# Patient Record
Sex: MALE | Race: WHITE | NOT HISPANIC OR LATINO | Employment: FULL TIME | URBAN - METROPOLITAN AREA
[De-identification: names, ages, dates, MRNs, and addresses within clinical notes are randomized per-mention and may not be internally consistent; named-entity substitution may affect disease eponyms.]

---

## 2019-07-02 ENCOUNTER — OFFICE VISIT (OUTPATIENT)
Dept: FAMILY MEDICINE | Facility: OTHER | Age: 39
End: 2019-07-02
Payer: COMMERCIAL

## 2019-07-02 VITALS
RESPIRATION RATE: 16 BRPM | HEART RATE: 84 BPM | WEIGHT: 268 LBS | TEMPERATURE: 98.7 F | DIASTOLIC BLOOD PRESSURE: 76 MMHG | SYSTOLIC BLOOD PRESSURE: 122 MMHG | BODY MASS INDEX: 32.63 KG/M2 | OXYGEN SATURATION: 98 % | HEIGHT: 76 IN

## 2019-07-02 DIAGNOSIS — Z23 NEED FOR TETANUS BOOSTER: ICD-10-CM

## 2019-07-02 DIAGNOSIS — F43.21 GRIEF REACTION: ICD-10-CM

## 2019-07-02 DIAGNOSIS — L03.012 CELLULITIS OF FINGER OF LEFT HAND: Primary | ICD-10-CM

## 2019-07-02 PROCEDURE — 90715 TDAP VACCINE 7 YRS/> IM: CPT | Performed by: PHYSICIAN ASSISTANT

## 2019-07-02 PROCEDURE — 90471 IMMUNIZATION ADMIN: CPT | Performed by: PHYSICIAN ASSISTANT

## 2019-07-02 PROCEDURE — 99203 OFFICE O/P NEW LOW 30 MIN: CPT | Mod: 25 | Performed by: PHYSICIAN ASSISTANT

## 2019-07-02 RX ORDER — CEPHALEXIN 500 MG/1
500 CAPSULE ORAL 2 TIMES DAILY
Qty: 14 CAPSULE | Refills: 0 | Status: SHIPPED | OUTPATIENT
Start: 2019-07-02 | End: 2019-12-04

## 2019-07-02 ASSESSMENT — PATIENT HEALTH QUESTIONNAIRE - PHQ9
SUM OF ALL RESPONSES TO PHQ QUESTIONS 1-9: 11
SUM OF ALL RESPONSES TO PHQ QUESTIONS 1-9: 11

## 2019-07-02 ASSESSMENT — MIFFLIN-ST. JEOR: SCORE: 2243.17

## 2019-07-02 ASSESSMENT — PAIN SCALES - GENERAL: PAINLEVEL: NO PAIN (0)

## 2019-07-02 NOTE — PROGRESS NOTES
"Subjective     Dar Palma is a 38 year old male who presents to clinic today for the following health issues:    HPI   Bee sting 6/30/19 left pinky, red and swollen and warm    Patient reports he had a bee sting on 6/30/2019. This occurred on the distal tip of his left pinky finger. He reports he cleaned the area and ensured the stinger was removed right after this happened. He reports since then his pinky finger has been red, swollen and warm. He reports pain really just started today. No open sores or drainage. He has been applying ice and taking Benadryl without improvement. No issue with bee stings in the past.     Addressed moods today. Patient reports his wife passed 3.5 months ago. Doing as best as he can. Feels moods are appropriate for recent loss. Declines any assistance/therapy at this time.     There is no problem list on file for this patient.    History reviewed. No pertinent surgical history.    Social History     Tobacco Use     Smoking status: Never Smoker     Smokeless tobacco: Never Used   Substance Use Topics     Alcohol use: Yes     No family history on file.      Current Outpatient Medications   Medication Sig Dispense Refill     cephALEXin (KEFLEX) 500 MG capsule Take 1 capsule (500 mg) by mouth 2 times daily for 7 days 14 capsule 0     No Known Allergies  BP Readings from Last 3 Encounters:   07/02/19 122/76    Wt Readings from Last 3 Encounters:   07/02/19 121.6 kg (268 lb)   06/06/16 112.5 kg (248 lb)         Reviewed and updated as needed this visit by Provider  Allergies  Meds  Problems  Med Hx  Surg Hx         Review of Systems   ROS COMP: Constitutional, HEENT, cardiovascular, pulmonary, gi and gu systems are negative, except as otherwise noted.      Objective    /76   Pulse 84   Temp 98.7  F (37.1  C) (Temporal)   Resp 16   Ht 1.924 m (6' 3.75\")   Wt 121.6 kg (268 lb)   SpO2 98%   BMI 32.84 kg/m    Body mass index is 32.84 kg/m .  Physical Exam   GENERAL: " healthy, alert and no distress  SKIN: no obvious defect from bee sting. Entire finger erythematous and edematous. Area is warm and mildly tender to the touch. Full ROM of finger.   PSYCH: mentation appears normal, affect normal/bright, anxious, judgement and insight intact and appearance well groomed    Diagnostic Test Results:  Labs reviewed in Epic  none         Assessment & Plan     1. Cellulitis of finger of left hand  Symptoms consistent with cellulitis from insect sting. Will start antibiotics as below. Encouraged keeping finger clean and dry. Discussed monitoring for signs of worsening infection. Patient will follow-up in clinic if new symptoms develop or current symptoms fail to improve.  - cephALEXin (KEFLEX) 500 MG capsule; Take 1 capsule (500 mg) by mouth 2 times daily for 7 days  Dispense: 14 capsule; Refill: 0    2. Need for tetanus booster  - TDAP, IM (10 - 64 YRS) - Adacel    3. Grief reaction  Patient feels he is getting along OK. Declines any help with moods at this time.     The patient indicates understanding of these issues and agrees with the plan.    Nighat Charles PA-C  [facility]    Answers for HPI/ROS submitted by the patient on 7/2/2019   PHQ9 TOTAL SCORE: 11

## 2019-07-02 NOTE — PROGRESS NOTES
Screening Questionnaire for Adult Immunization    Are you sick today?   No   Do you have allergies to medications, food, a vaccine component or latex?   No   Have you ever had a serious reaction after receiving a vaccination?   No   Do you have a long-term health problem with heart disease, lung disease, asthma, kidney disease, metabolic disease (e.g. diabetes), anemia, or other blood disorder?   No   Do you have cancer, leukemia, HIV/AIDS, or any other immune system problem?   No   In the past 3 months, have you taken medications that affect  your immune system, such as prednisone, other steroids, or anticancer drugs; drugs for the treatment of rheumatoid arthritis, Crohn s disease, or psoriasis; or have you had radiation treatments?   No   Have you had a seizure, or a brain or other nervous system problem?   No   During the past year, have you received a transfusion of blood or blood     products, or been given immune (gamma) globulin or antiviral drug?   No   For women: Are you pregnant or is there a chance you could become        pregnant during the next month?   No   Have you received any vaccinations in the past 4 weeks?   No     Immunization questionnaire answers were all negative.        Per orders of Nighat Charles, injection of Tdap given by Flavio Degroot. Patient instructed to remain in clinic for 15 minutes afterwards, and to report any adverse reaction to me immediately.       Screening performed by Flavio Degroot on 7/2/2019 at 9:16 AM.

## 2019-12-04 ENCOUNTER — OFFICE VISIT (OUTPATIENT)
Dept: FAMILY MEDICINE | Facility: CLINIC | Age: 39
End: 2019-12-04
Payer: COMMERCIAL

## 2019-12-04 VITALS
SYSTOLIC BLOOD PRESSURE: 132 MMHG | DIASTOLIC BLOOD PRESSURE: 86 MMHG | BODY MASS INDEX: 31.41 KG/M2 | HEIGHT: 75 IN | HEART RATE: 100 BPM | RESPIRATION RATE: 22 BRPM | WEIGHT: 252.6 LBS | TEMPERATURE: 98 F | OXYGEN SATURATION: 100 %

## 2019-12-04 DIAGNOSIS — F41.8 DEPRESSION WITH ANXIETY: Primary | ICD-10-CM

## 2019-12-04 DIAGNOSIS — F43.21 GRIEVING: ICD-10-CM

## 2019-12-04 DIAGNOSIS — Z13.6 CARDIOVASCULAR SCREENING; LDL GOAL LESS THAN 130: ICD-10-CM

## 2019-12-04 LAB
CHOLEST SERPL-MCNC: 179 MG/DL
GLUCOSE SERPL-MCNC: 99 MG/DL (ref 70–99)
HDLC SERPL-MCNC: 66 MG/DL
LDLC SERPL CALC-MCNC: 101 MG/DL
NONHDLC SERPL-MCNC: 113 MG/DL
TRIGL SERPL-MCNC: 60 MG/DL
TSH SERPL DL<=0.005 MIU/L-ACNC: 1.1 MU/L (ref 0.4–4)

## 2019-12-04 PROCEDURE — 84443 ASSAY THYROID STIM HORMONE: CPT | Performed by: NURSE PRACTITIONER

## 2019-12-04 PROCEDURE — 99214 OFFICE O/P EST MOD 30 MIN: CPT | Performed by: NURSE PRACTITIONER

## 2019-12-04 PROCEDURE — 36415 COLL VENOUS BLD VENIPUNCTURE: CPT | Performed by: NURSE PRACTITIONER

## 2019-12-04 PROCEDURE — 96127 BRIEF EMOTIONAL/BEHAV ASSMT: CPT | Performed by: NURSE PRACTITIONER

## 2019-12-04 PROCEDURE — 80061 LIPID PANEL: CPT | Performed by: NURSE PRACTITIONER

## 2019-12-04 PROCEDURE — 82947 ASSAY GLUCOSE BLOOD QUANT: CPT | Performed by: NURSE PRACTITIONER

## 2019-12-04 RX ORDER — DULOXETIN HYDROCHLORIDE 30 MG/1
30 CAPSULE, DELAYED RELEASE ORAL DAILY
Qty: 30 CAPSULE | Refills: 0 | Status: SHIPPED | OUTPATIENT
Start: 2019-12-04 | End: 2019-12-27 | Stop reason: SINTOL

## 2019-12-04 ASSESSMENT — ANXIETY QUESTIONNAIRES
6. BECOMING EASILY ANNOYED OR IRRITABLE: SEVERAL DAYS
IF YOU CHECKED OFF ANY PROBLEMS ON THIS QUESTIONNAIRE, HOW DIFFICULT HAVE THESE PROBLEMS MADE IT FOR YOU TO DO YOUR WORK, TAKE CARE OF THINGS AT HOME, OR GET ALONG WITH OTHER PEOPLE: SOMEWHAT DIFFICULT
5. BEING SO RESTLESS THAT IT IS HARD TO SIT STILL: SEVERAL DAYS
7. FEELING AFRAID AS IF SOMETHING AWFUL MIGHT HAPPEN: NOT AT ALL
GAD7 TOTAL SCORE: 9
1. FEELING NERVOUS, ANXIOUS, OR ON EDGE: MORE THAN HALF THE DAYS
3. WORRYING TOO MUCH ABOUT DIFFERENT THINGS: SEVERAL DAYS
2. NOT BEING ABLE TO STOP OR CONTROL WORRYING: SEVERAL DAYS

## 2019-12-04 ASSESSMENT — ENCOUNTER SYMPTOMS
SORE THROAT: 0
FREQUENCY: 0
WEAKNESS: 0
MYALGIAS: 0
NERVOUS/ANXIOUS: 1
NAUSEA: 0
EYE PAIN: 0
CONSTIPATION: 0
ABDOMINAL PAIN: 0
CHILLS: 0
FEVER: 0
DIZZINESS: 0
HEARTBURN: 0
SHORTNESS OF BREATH: 0
COUGH: 0
HEMATOCHEZIA: 0
PALPITATIONS: 1
HEADACHES: 0
DYSURIA: 0
JOINT SWELLING: 0
PARESTHESIAS: 0
DIARRHEA: 0
HEMATURIA: 0
ARTHRALGIAS: 0

## 2019-12-04 ASSESSMENT — PATIENT HEALTH QUESTIONNAIRE - PHQ9
SUM OF ALL RESPONSES TO PHQ QUESTIONS 1-9: 12
10. IF YOU CHECKED OFF ANY PROBLEMS, HOW DIFFICULT HAVE THESE PROBLEMS MADE IT FOR YOU TO DO YOUR WORK, TAKE CARE OF THINGS AT HOME, OR GET ALONG WITH OTHER PEOPLE: SOMEWHAT DIFFICULT
SUM OF ALL RESPONSES TO PHQ QUESTIONS 1-9: 12
5. POOR APPETITE OR OVEREATING: NEARLY EVERY DAY

## 2019-12-04 ASSESSMENT — MIFFLIN-ST. JEOR: SCORE: 2156.42

## 2019-12-04 NOTE — PROGRESS NOTES
Abnormal Mood Symptoms  Onset: 8 month    Description:   Depression: yes  Anxiety: YES    Accompanying Signs & Symptoms:  Still participating in activities that you used to enjoy: no  Fatigue: no  Irritability: no  Difficulty concentrating: YES  Changes in appetite: YES  Problems with sleep: YES  Heart racing/beating fast : YES  Thoughts of hurting yourself or others:  Yes, no attemps    History:   Recent stress: YES- wife passed in March  Prior depression hospitalization: None  Family history of depression: YES, ?father  Family history of anxiety: YES    Precipitating factors:   Alcohol/drug use: YES- 3/4 liter a day, or can be couple beers    Alleviating factors:  Busy thinking about things, starting a new company so helps to think about things    Therapies Tried and outcome: has been on something long ago. Didn't seem to like it, didn't feel anymore good or bad    Dar is a 39-year-old male presenting to clinic today regarding physical discomfort he has been feeling that he thinks is probably related to anxiety.  He has been experiencing episodes of shortness of breath and chest pain.  There are no associated symptoms.  He denies feeling lightheaded, diaphoretic, or nauseous.  This is not associated with any activity.  He states he can run 2 miles, perform heavy work, without any problems.  These symptoms appear when he is at rest and tends to be thinking about the recent passing of his wife.  Apparently she had been ill for some time, got better, then once again her condition worsened and she passed away in March of this year.    To his knowledge there is no family history of heart disease, but he is not familiar with his father's side of the family.  He has never smoked.  His weight is normal.  He has no history of high cholesterol, no history of hypertension.    He does not typically have history of anxiety.  At some time in the distant past he was dealing with some issues and was put on a medication  "which he took only for short time, since he states it made his emotions feel totally flat.  Otherwise, he does not describe himself as a worrier.  He states he is usually the one who is calm and reassuring other people that things are okay.    He has been self-medicating with alcohol, is aware of the fact that this is  an unhealthy practice      Today's PHQ-2 Score:   PHQ-2 ( 1999 Pfizer) 12/4/2019   Q1: Little interest or pleasure in doing things 2   Q2: Feeling down, depressed or hopeless 3   PHQ-2 Score 5   Q1: Little interest or pleasure in doing things More than half the days   Q2: Feeling down, depressed or hopeless Nearly every day   PHQ-2 Score 5         Social History     Tobacco Use     Smoking status: Never Smoker     Smokeless tobacco: Never Used   Substance Use Topics     Alcohol use: Yes         Reviewed and updated as needed this visit by clinical staff  Tobacco  Allergies  Meds  Med Hx  Surg Hx  Fam Hx  Soc Hx        Reviewed and updated as needed this visit by Provider            CONSTITUTIONAL: NEGATIVE for fever, chills, change in weight  INTEGUMENTARY/SKIN: NEGATIVE for worrisome rashes, moles or lesions  EYES: NEGATIVE for vision changes or irritation  ENT: NEGATIVE for ear, mouth and throat problems  RESP: NEGATIVE for significant cough or SOB  CV: NEGATIVE for chest pain, palpitations or peripheral edema  GI: NEGATIVE for nausea, abdominal pain, heartburn, or change in bowel habits   male: negative for dysuria, hematuria, decreased urinary stream, erectile dysfunction, urethral discharge  MUSCULOSKELETAL: NEGATIVE for significant arthralgias or myalgia  NEURO: NEGATIVE for weakness, dizziness or paresthesias  PSYCHIATRIC: NEGATIVE for changes in mood or affect    OBJECTIVE:   /86   Pulse 100   Temp 98  F (36.7  C) (Temporal)   Resp 22   Ht 1.905 m (6' 3\")   Wt 114.6 kg (252 lb 9.6 oz)   SpO2 100%   BMI 31.57 kg/m        PHQ-9 SCORE 12/4/2019   PHQ-9 Total Score MyChart 12 " (Moderate depression)   PHQ-9 Total Score 12     POOJA-7 SCORE 12/4/2019   Total Score 9     He admits to having suicidal thoughts.  However, he states this is more that he just does not really care, not that he has an any intent to harm himself or commit suicide.    General: Well-groomed, appropriately dressed.  Appears anxious and fidgety, no acute distress.  Heart: Rate and rhythm regular S1-S2 without murmur  Lungs: Clear to auscultation  Neuro: Alert and oriented x3.  Cranial nerves II through XII intact.  Gait balance and coordination normal  Psych: Mentation normal.  Insight good.  Is fidgety, continually moving and making small gestures.  Speech is somewhat pressured, he is quite communicative, not rambling.      ASSESSMENT/PLAN:       ICD-10-CM    1. Depression with anxiety F41.8 Glucose     TSH with free T4 reflex     DULoxetine (CYMBALTA) 30 MG capsule     MENTAL HEALTH REFERRAL  - Adult; Outpatient Treatment; Individual/Couples/Family/Group Therapy/Health Psychology; [facility]: [facility] [phone]; We will contact you to schedule the appointment or please call with any questions   2. Grieving F43.21 MENTAL HEALTH REFERRAL  - Adult; Outpatient Treatment; Individual/Couples/Family/Group Therapy/Health Psychology; [facility]: [facility] [phone]; We will contact you to schedule the appointment or please call with any questions   3. CARDIOVASCULAR SCREENING; LDL GOAL LESS THAN 130 Z13.6 Lipid panel reflex to direct LDL Fasting     We discussed medications.  He had X and experience he was not pleased with several years ago, but is willing to try different medication.  We will start Cymbalta 30 mg 1 tablet daily.  Effect of the medication and potential side effects were reviewed.  He is scheduled for a follow-up appointment in 3 to 4 weeks.  Referral is been made to counseling, he is aware he will be contacted to schedule an appointment    Discussed the issue with consuming  "alcohol.  Advised to abstain.  Patient is in agreement with this recommendation    Estimated body mass index is 31.57 kg/m  as calculated from the following:    Height as of this encounter: 1.905 m (6' 3\").    Weight as of this encounter: 114.6 kg (252 lb 9.6 oz).          reports that he has never smoked. He has never used smokeless tobacco.      Counseling Resources:  ATP IV Guidelines  Pooled Cohorts Equation Calculator  FRAX Risk Assessment  ICSI Preventive Guidelines  Dietary Guidelines for Americans, 2010  USDA's MyPlate  ASA Prophylaxis  Lung CA Screening    CADEN Haro [facility]  If you checked off any problems, how difficult have these problems made it for you to do your work, take care of things at home, or get along with other people?: Somewhat difficult  PHQ9 TOTAL SCORE: 12    "

## 2019-12-04 NOTE — LETTER
December 5, 2019      Dar Palma  [id number] [facility] [address]        Dear ,    We are writing to inform you of your test results.    Thyroid function, lipid profile, and blood sugar are all normal     Resulted Orders   Lipid panel reflex to direct LDL Fasting   Result Value Ref Range    Cholesterol 179 <200 mg/dL    Triglycerides 60 <150 mg/dL      Comment:      Fasting specimen    HDL Cholesterol 66 >39 mg/dL    LDL Cholesterol Calculated 101 (H) <100 mg/dL      Comment:      Above desirable:  100-129 mg/dl  Borderline High:  130-159 mg/dL  High:             160-189 mg/dL  Very high:       >189 mg/dl      Non HDL Cholesterol 113 <130 mg/dL   Glucose   Result Value Ref Range    Glucose 99 70 - 99 mg/dL      Comment:      Fasting specimen   TSH with free T4 reflex   Result Value Ref Range    TSH 1.10 0.40 - 4.00 mU/L       If you have any questions or concerns, please call the clinic at the number listed above.       Sincerely,        CADEN Haro CNP

## 2019-12-05 ENCOUNTER — TELEPHONE (OUTPATIENT)
Dept: FAMILY MEDICINE | Facility: CLINIC | Age: 39
End: 2019-12-05

## 2019-12-05 PROBLEM — F43.21 GRIEVING: Status: ACTIVE | Noted: 2019-12-05

## 2019-12-05 PROBLEM — F41.8 DEPRESSION WITH ANXIETY: Status: ACTIVE | Noted: 2019-12-05

## 2019-12-05 ASSESSMENT — ANXIETY QUESTIONNAIRES: GAD7 TOTAL SCORE: 9

## 2019-12-05 ASSESSMENT — PATIENT HEALTH QUESTIONNAIRE - PHQ9: SUM OF ALL RESPONSES TO PHQ QUESTIONS 1-9: 12

## 2019-12-05 NOTE — TELEPHONE ENCOUNTER
Reason for call:  Patient reporting a symptom    Symptom or request: Nausea, diarrhea & no appetite    Duration (how long have symptoms been present): since starting the Cymbalta     Have you been treated for this before? No    Additional comments: Pt states the bottle says to call right away if this happens. Please advise.     Phone Number patient can be reached at:  Home number on file [phone] (home)    Best Time:  Any     Can we leave a detailed message on this number:  YES    Call taken on 12/5/2019 at 4:53 PM by Annmarie Reyez

## 2019-12-06 NOTE — TELEPHONE ENCOUNTER
Called patient. Pt states that he called because that is what the paperwork said to do.  He is having intermittent nausea throughout the day and some diarrhea. He would not rate these as severe, but his appetite is very minimal and he is eating small snacks throughout the day.  Discussed whether patient wanted to stop medication or continue through the weekend. He prefers to continue to see if it gets any better.    Will plan on touching base next week.    John Molina, KevinD, [facility], [place]  Medication Therapy Management Pharmacist  Pager: [phone]

## 2019-12-16 NOTE — TELEPHONE ENCOUNTER
Dar calls today to let Camelia Dobbs and John Molina know that his symptoms with the Cymbalta have continued.  He has constant nausea and stomach cramps. Also complete lack of appetite.

## 2019-12-17 NOTE — TELEPHONE ENCOUNTER
Patient is scheduled on 12/27 to discuss starting a new medication. Patient has stopped the medication yesterday. He states that he has an appointment with counseling as well.    Deyvi Florian, [facility]

## 2019-12-17 NOTE — TELEPHONE ENCOUNTER
Please tell patient to discontinue the medication if the side effects have persisted.  See whether or not they resolve after he discontinues the medication, then schedule appointment in clinic to establish care with provider and discuss other management.  He may want to try different medication, we had also discussed possibly going to counseling.

## 2019-12-27 ENCOUNTER — OFFICE VISIT (OUTPATIENT)
Dept: FAMILY MEDICINE | Facility: CLINIC | Age: 39
End: 2019-12-27
Payer: COMMERCIAL

## 2019-12-27 VITALS
OXYGEN SATURATION: 98 % | SYSTOLIC BLOOD PRESSURE: 120 MMHG | RESPIRATION RATE: 18 BRPM | WEIGHT: 257.3 LBS | HEIGHT: 75 IN | BODY MASS INDEX: 31.99 KG/M2 | HEART RATE: 108 BPM | TEMPERATURE: 97.5 F | DIASTOLIC BLOOD PRESSURE: 82 MMHG

## 2019-12-27 DIAGNOSIS — F41.9 ANXIETY: Primary | ICD-10-CM

## 2019-12-27 DIAGNOSIS — R00.2 PALPITATIONS: ICD-10-CM

## 2019-12-27 PROCEDURE — 93000 ELECTROCARDIOGRAM COMPLETE: CPT | Performed by: FAMILY MEDICINE

## 2019-12-27 PROCEDURE — 99214 OFFICE O/P EST MOD 30 MIN: CPT | Mod: 25 | Performed by: FAMILY MEDICINE

## 2019-12-27 RX ORDER — ALPRAZOLAM 0.5 MG
0.5 TABLET ORAL 3 TIMES DAILY PRN
Qty: 15 TABLET | Refills: 0 | Status: SHIPPED | OUTPATIENT
Start: 2019-12-27 | End: 2024-07-23

## 2019-12-27 ASSESSMENT — MIFFLIN-ST. JEOR: SCORE: 2177.74

## 2019-12-27 NOTE — PROGRESS NOTES
"Subjective     Dar Palma is a 39 year old male who presents to clinic today for the following health issues:    HPI     Patient has stopped with the Cymbalta it was making him ill.         Depression and Anxiety/Est. Care     How are you doing with your depression since your last visit? \" I dunno\"     How are you doing with your anxiety since your last visit?  Worsened, having panic attacks.     Are you having other symptoms that might be associated with depression or anxiety? Yes:  Panic attacks and heart palpitations     Have you had a significant life event? OTHER: death of wife      Do you have any concerns with your use of alcohol or other drugs? No    Social History     Tobacco Use     Smoking status: Never Smoker     Smokeless tobacco: Never Used   Substance Use Topics     Alcohol use: Yes     Comment: every day      Drug use: No     PHQ 2019   PHQ-9 Total Score 11 12   Q9: Thoughts of better off dead/self-harm past 2 weeks Several days Several days   F/U: Thoughts of suicide or self-harm No Yes   F/U: Self harm-plan - No   F/U: Self-harm action - No   F/U: Safety concerns No No     POOJA-7 SCORE 2019   Total Score 9           Suicide Assessment Five-step Evaluation and Treatment (SAFE-T)        SUBJECTIVE:  Dar  is a 39 year old male who presents for: Concerns about panic attacks and some palpitations.  He had a panic attack in the dental chair a while back.  A lot of this started after his wife  last spring.  I do not know all the details but it sounds like she  from alcoholic cirrhosis and encephalopathy.  Dealing with it on his own but was seen earlier in the month by 1 of my partners and started on Cymbalta.  This did not set well with him at all made him quite ill.  Never been on any kind of medication before but certainly does not want to be on this.  When he gets stressed out he feels his heart racing.  No family history of heart disease.    History reviewed. No " "pertinent past medical history.  History reviewed. No pertinent surgical history.  Social History     Tobacco Use     Smoking status: Never Smoker     Smokeless tobacco: Never Used   Substance Use Topics     Alcohol use: Yes     Comment: every day      Current Outpatient Medications   Medication Sig Dispense Refill     ALPRAZolam (XANAX) 0.5 MG tablet Take 1 tablet (0.5 mg) by mouth 3 times daily as needed for anxiety 15 tablet 0     DULoxetine (CYMBALTA) 30 MG capsule Take 1 capsule (30 mg) by mouth daily (Patient not taking: Reported on 12/27/2019) 30 capsule 0       REVIEW OF SYSTEMS:   5 point ROS negative except as noted above in HPI, including Gen., Resp, CV, GI &  system review.     OBJECTIVE:  Vitals: /82 (BP Location: Right arm, Patient Position: Sitting, Cuff Size: Adult Large)   Pulse 108   Temp 97.5  F (36.4  C) (Temporal)   Resp 18   Ht 1.905 m (6' 3\")   Wt 116.7 kg (257 lb 4.8 oz)   SpO2 98%   BMI 32.16 kg/m    BMI= Body mass index is 32.16 kg/m .  He is alert appears well.  A little anxious appearing.  Appropriately groomed and dressed good eye contact.  PHQ 9 score is 7 POOJA 7 score is 4.  eyes PERRLA.  Neck supple no thyromegaly.  Lungs are clear.  Heart with a regular rhythm rate in the 60s.  EKG is normal.  Reviewed his TSH blood sugar from 3 weeks ago and these were all fine.    ASSESSMENT:  #1 anxiety #2 palpitations    PLAN:  Reviewed with him that he has virtually no cardiac risk factors.  We did do an EKG to rule out any baseline electrical abnormality that could be leading to dysrhythmia.  And this was normal.  Some long-term monitoring device would be in order if he has further frequent episodes of palpitations.  He does not want to be on a daily medication as he feels this just situational.  Discussed the use of Xanax with him and he agrees to go with this.  He does have a appointment with counseling in 3 days.  He will keep this.  Follow-up in about a month to see how he is " doing.        Charan Montelongo MD  [facility]

## 2020-07-21 ENCOUNTER — OFFICE VISIT (OUTPATIENT)
Dept: FAMILY MEDICINE | Facility: CLINIC | Age: 40
End: 2020-07-21
Payer: COMMERCIAL

## 2020-07-21 VITALS
TEMPERATURE: 97.5 F | HEART RATE: 88 BPM | SYSTOLIC BLOOD PRESSURE: 110 MMHG | BODY MASS INDEX: 31.62 KG/M2 | WEIGHT: 253 LBS | OXYGEN SATURATION: 100 % | DIASTOLIC BLOOD PRESSURE: 64 MMHG

## 2020-07-21 DIAGNOSIS — L03.313 CELLULITIS OF CHEST WALL: Primary | ICD-10-CM

## 2020-07-21 DIAGNOSIS — T63.461A WASP STING, ACCIDENTAL OR UNINTENTIONAL, INITIAL ENCOUNTER: ICD-10-CM

## 2020-07-21 PROCEDURE — 99213 OFFICE O/P EST LOW 20 MIN: CPT | Performed by: FAMILY MEDICINE

## 2020-07-21 RX ORDER — CEPHALEXIN 500 MG/1
500 CAPSULE ORAL 3 TIMES DAILY
Qty: 15 CAPSULE | Refills: 0 | Status: SHIPPED | OUTPATIENT
Start: 2020-07-21 | End: 2024-07-23

## 2020-07-21 ASSESSMENT — PAIN SCALES - GENERAL: PAINLEVEL: NO PAIN (0)

## 2020-07-21 NOTE — PROGRESS NOTES
Subjective     Dar Palma is a 39 year old male who presents to clinic today for the following health issues:    HPI       Chief Complaint   Patient presents with     Cellulitis     right side chest. was stung on Sunday. now red, itchy     Hernán states he was stung by a wasp over his right mid-clavicle on Sunday and noted typical redness at the site immediately with usual level of discomfort.  No systemic reaction was noted.  However, over the next 48 hours or so he has noted progressive spread of redness with warmth over the right anterior chest and swelling at the site of the sting.  He is not certain if the stinger was removed or present.  No fever, chills, signs of systemic infections.  He also states he has had cellulitis in the past secondary to wasp/bee stings.       Objective    /64 (Cuff Size: Adult Large)   Pulse 88   Temp 97.5  F (36.4  C) (Temporal)   Wt 114.8 kg (253 lb)   SpO2 100%   BMI 31.62 kg/m    Body mass index is 31.62 kg/m .  Physical Exam     Alert and oriented, in no acute distress.  Over the right clavicle there is a focal 2mm raised blister that appears to be the obvious site of being stung.  Surrounding this is a red, 1cm more intense area of inflammation with warmth and generalized swelling of the soft tissue over an area of about 5x5 cm in size.  The right anterior chest has diffuse erythema and is warm to the touch, consistent with some cellulitis, inflammatory vs infectious.     Discussed with Dar that he may still have stinger parts in the site and this would be inhibitory to resolution, so exploration for this was recommended.  The area was prepped with alcohol and using a sterile splinter forceps and aseptic technique, the roof of the blister was lifted and a single droplet of purulent material was noted and a yellow, narrow, 1mm long piece noted, suspected to be the stinger.  Gentle pressure was applied to the surrounding tissue and no further purulence  noted but did express serosanguinous fluid from the opening.  Bacitracin and bandaid were applied.     ASSESSMENT:   Cellulitis of chest wall  Wasp sting, accidental or unintentional, initial encounter    PLAN:  Will treat with keflex 500 mg tid x 5 days.  To call if persists or worsens.  Discussed acute management of bee/wasp stings with meat tenderizer and ice to reduce discomfort and promote expelling of toxin and stinger in the future.      Electronically signed by Greg Schoen, MD

## 2021-05-15 ENCOUNTER — HEALTH MAINTENANCE LETTER (OUTPATIENT)
Age: 41
End: 2021-05-15

## 2021-10-24 ENCOUNTER — HEALTH MAINTENANCE LETTER (OUTPATIENT)
Age: 41
End: 2021-10-24

## 2022-06-05 ENCOUNTER — HEALTH MAINTENANCE LETTER (OUTPATIENT)
Age: 42
End: 2022-06-05

## 2022-10-16 ENCOUNTER — HEALTH MAINTENANCE LETTER (OUTPATIENT)
Age: 42
End: 2022-10-16

## 2023-06-17 ENCOUNTER — HEALTH MAINTENANCE LETTER (OUTPATIENT)
Age: 43
End: 2023-06-17

## 2024-07-23 ENCOUNTER — OFFICE VISIT (OUTPATIENT)
Dept: FAMILY MEDICINE | Facility: CLINIC | Age: 44
End: 2024-07-23
Payer: COMMERCIAL

## 2024-07-23 VITALS
TEMPERATURE: 97.8 F | SYSTOLIC BLOOD PRESSURE: 130 MMHG | DIASTOLIC BLOOD PRESSURE: 88 MMHG | RESPIRATION RATE: 11 BRPM | OXYGEN SATURATION: 99 % | HEIGHT: 76 IN | BODY MASS INDEX: 26.53 KG/M2 | HEART RATE: 64 BPM | WEIGHT: 217.9 LBS

## 2024-07-23 DIAGNOSIS — M25.512 CHRONIC LEFT SHOULDER PAIN: Primary | ICD-10-CM

## 2024-07-23 DIAGNOSIS — G89.29 CHRONIC LEFT SHOULDER PAIN: Primary | ICD-10-CM

## 2024-07-23 PROCEDURE — G2211 COMPLEX E/M VISIT ADD ON: HCPCS | Performed by: FAMILY MEDICINE

## 2024-07-23 PROCEDURE — 99203 OFFICE O/P NEW LOW 30 MIN: CPT | Performed by: FAMILY MEDICINE

## 2024-07-23 RX ORDER — EPINEPHRINE 0.3 MG/.3ML
0.3 INJECTION SUBCUTANEOUS PRN
COMMUNITY

## 2024-07-23 ASSESSMENT — PAIN SCALES - GENERAL: PAINLEVEL: NO PAIN (0)

## 2024-07-23 ASSESSMENT — ANXIETY QUESTIONNAIRES
3. WORRYING TOO MUCH ABOUT DIFFERENT THINGS: NOT AT ALL
7. FEELING AFRAID AS IF SOMETHING AWFUL MIGHT HAPPEN: NOT AT ALL
2. NOT BEING ABLE TO STOP OR CONTROL WORRYING: NOT AT ALL
GAD7 TOTAL SCORE: 0
GAD7 TOTAL SCORE: 0
5. BEING SO RESTLESS THAT IT IS HARD TO SIT STILL: NOT AT ALL
1. FEELING NERVOUS, ANXIOUS, OR ON EDGE: NOT AT ALL
IF YOU CHECKED OFF ANY PROBLEMS ON THIS QUESTIONNAIRE, HOW DIFFICULT HAVE THESE PROBLEMS MADE IT FOR YOU TO DO YOUR WORK, TAKE CARE OF THINGS AT HOME, OR GET ALONG WITH OTHER PEOPLE: NOT DIFFICULT AT ALL
6. BECOMING EASILY ANNOYED OR IRRITABLE: NOT AT ALL

## 2024-07-23 ASSESSMENT — PATIENT HEALTH QUESTIONNAIRE - PHQ9
5. POOR APPETITE OR OVEREATING: NOT AT ALL
SUM OF ALL RESPONSES TO PHQ QUESTIONS 1-9: 0

## 2024-07-23 NOTE — PROGRESS NOTES
"  Assessment & Plan     Chronic left shoulder pain  Chronic left shoulder pain for years that has been worsening over the last several years and significantly sore over the last week.  He is noticing some difficulty with doing specific activities.  When working out he is having difficulty with pull-ups especially when hands are further apart.  He is having pain other times in his day-to-day well.  He has not had prior testing.  Discussed option of Ortho referral, physical therapy, or MRI.  Discussed MRI for further evaluation because of chronicity of pain and worsening.  Patient would like to proceed with MRI first.  In the meantime okay to use ibuprofen and recommend icing especially after workouts.  Will notify results and discuss plan from there.  - MR Shoulder Left w/o Contrast; Future          BMI  Estimated body mass index is 26.53 kg/m  as calculated from the following:    Height as of this encounter: 1.93 m (6' 3.98\").    Weight as of this encounter: 98.8 kg (217 lb 14.4 oz).   Weight management plan: Patient was referred to their PCP to discuss a diet and exercise plan.    The longitudinal plan of care for the diagnosis(es)/condition(s) as documented were addressed during this visit. Due to the added complexity in care, I will continue to support Dar in the subsequent management and with ongoing continuity of care.          Subjective   Dar is a 43 year old, presenting for the following health issues:  Musculoskeletal Problem        7/23/2024     9:12 AM   Additional Questions   Roomed by Grace RUBIO   Accompanied by None         7/23/2024     9:12 AM   Patient Reported Additional Medications   Patient reports taking the following new medications NA     History of Present Illness       Reason for visit:  Shoulder pain  Symptom onset:  More than a month  Symptoms include:  Pain in shoulder  Symptom intensity:  Moderate  Symptom progression:  Worsening  Had these symptoms before:  Yes  Has " "tried/received treatment for these symptoms:  No  What makes it worse:  Pull ups  What makes it better:  Shoulder rotations    He eats 4 or more servings of fruits and vegetables daily.He consumes 1 sweetened beverage(s) daily.He exercises with enough effort to increase his heart rate 30 to 60 minutes per day.  He exercises with enough effort to increase his heart rate 6 days per week.   He is taking medications regularly.           Pain History:  When did you first notice your pain? 10 years    Have you seen this provider for your pain in the past? No   Where in your body do your have pain? Left Shoulder   Are you seeing anyone else for your pain? No  What makes your pain better? Stretching   What makes your pain worse? Overuse  How has pain affected your ability to work? 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 Self       Push ups aren't an issue. Pull ups are.   Notices a lot of clicking. Not necessarily associated with discomfort.   Slowly progressing over the last several years.   Feels like pain is in the joint.   Feels sometimes like it is hard to move the shoulder. Sometimes when driving.   Sometimes radiates into upper arm.   No specific modalities tried to help - no ice or heat.     Hard to sleep on that side.                                         Objective    /88   Pulse 64   Temp 97.8  F (36.6  C) (Temporal)   Resp 11   Ht 1.93 m (6' 3.98\")   Wt 98.8 kg (217 lb 14.4 oz)   SpO2 99%   BMI 26.53 kg/m    Body mass index is 26.53 kg/m .  Physical Exam   GENERAL: alert and no distress  ORTHO:   SHOULDER Exam-Left   Inspection: no swelling, no bruising, no discoloration, no obvious deformity, no asymmetry, no glenohumeral joint anterior bulge, no distal clavicle elevation, no muscle atrophy, no scapular winging   Tenderness of: SC joint- no, clavicle(prox-mid)- no, clavicle-(mid-distal)- no, AC joint- no, acromion- no, " anterior capsule- no, prox bicep tendon- no, greater tuberosity- no, prox humerus- no, supraspinatous- no, infraspinatous- no, superior trapezious- no, rhomboids- no   Range of Motion: Active- forward flexion- normal, abduction- normal, external rotation- normal, internal rotation- normal. Does have crepitation.    Strength: forward flexion- 5/5, abduction- 5/5, internal rotation- 5/5, external rotation- 5/5 and bicep- full   Has discomfort with strength testing with internal rotation                  Signed Electronically by: Angela Marie MD

## 2024-08-01 ENCOUNTER — MYC MEDICAL ADVICE (OUTPATIENT)
Dept: FAMILY MEDICINE | Facility: CLINIC | Age: 44
End: 2024-08-01
Payer: COMMERCIAL

## 2024-08-01 DIAGNOSIS — M25.512 CHRONIC LEFT SHOULDER PAIN: Primary | ICD-10-CM

## 2024-08-01 DIAGNOSIS — G89.29 CHRONIC LEFT SHOULDER PAIN: Primary | ICD-10-CM

## 2024-08-05 ENCOUNTER — HOSPITAL ENCOUNTER (OUTPATIENT)
Dept: GENERAL RADIOLOGY | Facility: CLINIC | Age: 44
Discharge: HOME | End: 2024-08-05
Attending: FAMILY MEDICINE | Admitting: FAMILY MEDICINE
Payer: COMMERCIAL

## 2024-08-05 DIAGNOSIS — G89.29 CHRONIC LEFT SHOULDER PAIN: ICD-10-CM

## 2024-08-05 DIAGNOSIS — M25.512 CHRONIC LEFT SHOULDER PAIN: ICD-10-CM

## 2024-08-05 PROCEDURE — 73030 X-RAY EXAM OF SHOULDER: CPT | Mod: LT

## 2024-08-07 NOTE — PROGRESS NOTES
Dar Palma  :  1980  DOS: 2024  MRN: [id number]  PCP: No Ref-Primary, Physician    Sports Medicine Clinic Visit      HPI  Dar Palma is a 43 year old male who is seen in consultation at the request of  Angela Marie M.D. presenting with chronic left shoulder pain.    - Mechanism of Injury:    - No inciting injury. 12 years of discomfort  - Pertinent history and prior evaluations:    - 2024 with Angela Marie MD: Chronic left shoulder pain worsening over past several  years. Ibuprofen and ice recommended.  Also discussed possibility of MRI, sports medicine/Ortho referral, corticosteroid injections.  Attempted to get an MRI, but it was denied by insurance.    - 2024 left shoulder xray shows normal anatomic alignment without acute fracture or dislocation.  Type III acromion with slight hooking the subacromial space.  No major degenerative changes.  - IMPRESSION: The left glenohumeral and acromioclavicular joints are negative for fracture or dislocation.    - Pain Character:    - Location:  lateral left shoulder pain into lateral left neck  - Character:  constant soreness  - Duration:  12 years  - Course:  worsening over past 5 months  - Endorses:    - slow to warm up during exercises, shoulder clicking intermittently  - Denies:    - swelling, numbness, tingling, radicular shooting pain, weakness  - Alleviating factors:    -  nothing  - Aggravating factors:    - pullups, unable to sleep on left shoulder  - Other treatments tried:    -  ibuprofen, Tylenol    - Patient Goals:    - get a formal diagnosis, discuss treatment options  - Social History:   - Employed:   ([employer] work)      Review of Systems  Musculoskeletal: as above  Remainder of review of systems is negative including constitutional, CV, pulmonary, GI, Skin and Neurologic except as noted in HPI or medical history.    Past Medical History:   Diagnosis Date    De Quervain's disease  (tenosynovitis)      No past surgical history on file.  No family history on file.      Objective  /88   Wt 98.8 kg (217 lb 14.4 oz)   BMI 26.53 kg/m      General: healthy, alert and in no acute distress.    HEENT: no scleral icterus or conjunctival erythema.   Skin: no suspicious lesions or rash. No jaundice.   CV: regular rhythm by palpation, 2+ distal pulses.  Resp: normal respiratory effort without conversational dyspnea.   Psych: normal mood and affect.    Gait: nonantalgic, appropriate coordination and balance.     Neuro:        - Sensation to light touch:    - Intact throughout the BUE including all peripheral nerve distributions.        - MSR:       RUE  LUE  - Biceps  2+ 2+  - Brachioradialis 2+ 2+  - Triceps  2+ 2+       - Special tests:   - Spurling's:  Neg    MSK - Shoulder:       - Inspection:    - No significant swelling, erythema, warmth, ecchymosis, lesion, or atrophy noted.        - ROM:    - Full AROM/PROM with pain during shoulder abduction, flexion, IR/ER.        - Palpation:    - TTP at the lateral deltoid, subacromial space.  - NTTP elsewhere.        - Strength:  (*antalgic)  - Shoulder Abduction   5    - Shoulder Flexion   5    - Shoulder Internal Rotation  5    - Shoulder External Rotation  5   - Elbow Flexion   5   - Elbow Extension   5   - Forearm Pronation   5   - Forearm Supination   5   - Wrist Extension   5   - Wrist Flexion    5   - FDI     5   - ADM     5   - FPL     5   - APB     5   - EIP     5  - EDC     5   - APL/EPB    5            - Special tests:        - Montilla: Positive   - Neers: Positive   - Empty can: Positive    - [place]:  Neg    - Scarf:  Neg    - Speeds:  Neg    - Yergason:  Neg    - Apprehension/Relocation:  Neg       Radiology  I independently reviewed the available relevant imaging in the chart with my interpretations as above in HPI.       Assessment  1. Rotator cuff impingement syndrome of left shoulder        Plan  Dar Palma is a pleasant 43  year old male that presents with chronic lateral left shoulder pain.  Pain with certain physical activities including pull-ups, shoulder abduction, overhead activities.  Positive impingement signs on exam without concern for an acute tear. History and physical exam appear most consistent with rotator cuff impingement syndrome of the left shoulder.     We discussed the nature of the condition and available treatment options, and mutually agreed upon the following plan:    - Imaging:          - Reviewed and independently interpreted the relevant imaging in the chart, including any imaging ordered for today's clinic.  - Reviewed results and images with patient.   - Medications:          - Discussed pharmacologic options for pain relief.   - May use NSAIDs (Ibuprofen, Naproxen) or Acetaminophen (Tylenol) as needed for pain control.   - Do not take these if previously advised to avoid them for other medical conditions.  - May also use topical medications such as lidocaine, IcyHot, BioFreeze, or Voltaren gel as needed for pain control.    - Voltaren gel is an anti-inflammatory cream that may be used up to 4 times per day over the painful area.   - Injections:          - Discussed possible injection options and alternatives.    - Injection options include:      - Deferred injections today and will consider them in the future as needed.   - Therapy:          - Discussed the benefits of therapy vs home exercise program for optimization of range of motion, flexibility, strength, stability and function.   - Preference is for therapy.   - Physical Therapy referral placed today and instructed to call [phone] to schedule appointments.   - Modalities:          - May use ice, heat, massage or other modalities as needed.  - Activity:          - Encouraged to remain active and participate in regular activities as symptoms allow.   Avoid or modify exacerbating activities as needed.  - Follow up:          - As needed for  re-evaluation and update to treatment plan.  - May follow up sooner for new/worsening symptoms.  - May contact clinic by phone or MyChart for questions or concerns.       Seng Bashir DO, CAQSM  [facility] Sports Medicine  [facility] - Department of Orthopedic Surgery       Disclaimer:  This note was prepared and written using Dragon Medical dictation software. As a result, there may be errors in the script that have gone undetected. Please consider this when interpreting the information in this note.

## 2024-08-08 ENCOUNTER — OFFICE VISIT (OUTPATIENT)
Dept: ORTHOPEDICS | Facility: CLINIC | Age: 44
End: 2024-08-08
Attending: FAMILY MEDICINE
Payer: COMMERCIAL

## 2024-08-08 VITALS — DIASTOLIC BLOOD PRESSURE: 88 MMHG | WEIGHT: 217.9 LBS | BODY MASS INDEX: 26.53 KG/M2 | SYSTOLIC BLOOD PRESSURE: 130 MMHG

## 2024-08-08 DIAGNOSIS — M75.42 ROTATOR CUFF IMPINGEMENT SYNDROME OF LEFT SHOULDER: Primary | ICD-10-CM

## 2024-08-08 PROCEDURE — 99204 OFFICE O/P NEW MOD 45 MIN: CPT | Performed by: STUDENT IN AN ORGANIZED HEALTH CARE EDUCATION/TRAINING PROGRAM

## 2024-08-08 ASSESSMENT — PAIN SCALES - GENERAL: PAINLEVEL: MILD PAIN (2)

## 2024-08-08 NOTE — LETTER
2024      Dar Palma  [address]  [facility] [address]      Dear Colleague,    Thank you for referring your patient, Dar Palma, to the [facility] [place]. Please see a copy of my visit note below.    Dar Palma  :  1980  DOS: 2024  MRN: [id number]  PCP: No Ref-Primary, Physician    Sports Medicine Clinic Visit      HPI  Dar Palma is a 43 year old male who is seen in consultation at the request of  Angela Marie M.D. presenting with chronic left shoulder pain.    - Mechanism of Injury:    - No inciting injury. 12 years of discomfort  - Pertinent history and prior evaluations:    - 2024 with Angela Marie MD: Chronic left shoulder pain worsening over past several  years. Ibuprofen and ice recommended.  Also discussed possibility of MRI, sports medicine/Ortho referral, corticosteroid injections.  Attempted to get an MRI, but it was denied by insurance.    - 2024 left shoulder xray shows normal anatomic alignment without acute fracture or dislocation.  Type III acromion with slight hooking the subacromial space.  No major degenerative changes.  - IMPRESSION: The left glenohumeral and acromioclavicular joints are negative for fracture or dislocation.    - Pain Character:    - Location:  lateral left shoulder pain into lateral left neck  - Character:  constant soreness  - Duration:  12 years  - Course:  worsening over past 5 months  - Endorses:    - slow to warm up during exercises, shoulder clicking intermittently  - Denies:    - swelling, numbness, tingling, radicular shooting pain, weakness  - Alleviating factors:    -  nothing  - Aggravating factors:    - pullups, unable to sleep on left shoulder  - Other treatments tried:    -  ibuprofen, Tylenol    - Patient Goals:    - get a formal diagnosis, discuss treatment options  - Social History:   - Employed:   ([employer])      Review of  Systems  Musculoskeletal: as above  Remainder of review of systems is negative including constitutional, CV, pulmonary, GI, Skin and Neurologic except as noted in HPI or medical history.    Past Medical History:   Diagnosis Date     De Quervain's disease (tenosynovitis)      No past surgical history on file.  No family history on file.      Objective  /88   Wt 98.8 kg (217 lb 14.4 oz)   BMI 26.53 kg/m      General: healthy, alert and in no acute distress.    HEENT: no scleral icterus or conjunctival erythema.   Skin: no suspicious lesions or rash. No jaundice.   CV: regular rhythm by palpation, 2+ distal pulses.  Resp: normal respiratory effort without conversational dyspnea.   Psych: normal mood and affect.    Gait: nonantalgic, appropriate coordination and balance.     Neuro:        - Sensation to light touch:    - Intact throughout the BUE including all peripheral nerve distributions.        - MSR:       RUE  LUE  - Biceps  2+ 2+  - Brachioradialis 2+ 2+  - Triceps  2+ 2+       - Special tests:   - Spurling's:  Neg    MSK - Shoulder:       - Inspection:    - No significant swelling, erythema, warmth, ecchymosis, lesion, or atrophy noted.        - ROM:    - Full AROM/PROM with pain during shoulder abduction, flexion, IR/ER.        - Palpation:    - TTP at the lateral deltoid, subacromial space.  - NTTP elsewhere.        - Strength:  (*antalgic)  - Shoulder Abduction   5    - Shoulder Flexion   5    - Shoulder Internal Rotation  5    - Shoulder External Rotation  5   - Elbow Flexion   5   - Elbow Extension   5   - Forearm Pronation   5   - Forearm Supination   5   - Wrist Extension   5   - Wrist Flexion    5   - FDI     5   - ADM     5   - FPL     5   - APB     5   - EIP     5  - EDC     5   - APL/EPB    5            - Special tests:        - Montilla: Positive   - Neers: Positive   - Empty can: Positive    - [place]:  Neg    - Scarf:  Neg    - Speeds:  Neg    - Yergason:  Neg    - Apprehension/Relocation:   Neg       Radiology  I independently reviewed the available relevant imaging in the chart with my interpretations as above in HPI.       Assessment  1. Rotator cuff impingement syndrome of left shoulder        Plan  Dar Palma is a pleasant 43 year old male that presents with chronic lateral left shoulder pain.  Pain with certain physical activities including pull-ups, shoulder abduction, overhead activities.  Positive impingement signs on exam without concern for an acute tear. History and physical exam appear most consistent with rotator cuff impingement syndrome of the left shoulder.     We discussed the nature of the condition and available treatment options, and mutually agreed upon the following plan:    - Imaging:          - Reviewed and independently interpreted the relevant imaging in the chart, including any imaging ordered for today's clinic.  - Reviewed results and images with patient.   - Medications:          - Discussed pharmacologic options for pain relief.   - May use NSAIDs (Ibuprofen, Naproxen) or Acetaminophen (Tylenol) as needed for pain control.   - Do not take these if previously advised to avoid them for other medical conditions.  - May also use topical medications such as lidocaine, IcyHot, BioFreeze, or Voltaren gel as needed for pain control.    - Voltaren gel is an anti-inflammatory cream that may be used up to 4 times per day over the painful area.   - Injections:          - Discussed possible injection options and alternatives.    - Injection options include:      - Deferred injections today and will consider them in the future as needed.   - Therapy:          - Discussed the benefits of therapy vs home exercise program for optimization of range of motion, flexibility, strength, stability and function.   - Preference is for therapy.   - Physical Therapy referral placed today and instructed to call [phone] to schedule appointments.   - Modalities:          - May use ice,  heat, massage or other modalities as needed.  - Activity:          - Encouraged to remain active and participate in regular activities as symptoms allow.   Avoid or modify exacerbating activities as needed.  - Follow up:          - As needed for re-evaluation and update to treatment plan.  - May follow up sooner for new/worsening symptoms.  - May contact clinic by phone or MyChart for questions or concerns.       Seng Bashir DO, CAQSM  [facility] - Sports Medicine  [facility] - Department of Orthopedic Surgery       Disclaimer:  This note was prepared and written using Dragon Medical dictation software. As a result, there may be errors in the script that have gone undetected. Please consider this when interpreting the information in this note.       Again, thank you for allowing me to participate in the care of your patient.        Sincerely,        Seng Bashir DO

## 2024-08-10 ENCOUNTER — HEALTH MAINTENANCE LETTER (OUTPATIENT)
Age: 44
End: 2024-08-10

## 2024-08-13 ENCOUNTER — THERAPY VISIT (OUTPATIENT)
Dept: PHYSICAL THERAPY | Facility: CLINIC | Age: 44
End: 2024-08-13
Attending: STUDENT IN AN ORGANIZED HEALTH CARE EDUCATION/TRAINING PROGRAM
Payer: COMMERCIAL

## 2024-08-13 DIAGNOSIS — M75.42 ROTATOR CUFF IMPINGEMENT SYNDROME OF LEFT SHOULDER: ICD-10-CM

## 2024-08-13 PROCEDURE — 97161 PT EVAL LOW COMPLEX 20 MIN: CPT | Mod: GP | Performed by: PHYSICAL THERAPIST

## 2024-08-13 PROCEDURE — 97112 NEUROMUSCULAR REEDUCATION: CPT | Mod: GP | Performed by: PHYSICAL THERAPIST

## 2024-08-13 ASSESSMENT — ACTIVITIES OF DAILY LIVING (ADL)
PUTTING_ON_YOUR_PANTS: 0
PLACING_AN_OBJECT_ON_A_HIGH_SHELF: 0
REACHING_FOR_SOMETHING_ON_A_HIGH_SHELF: 3
CARRYING_A_HEAVY_OBJECT_OF_10_POUNDS: 0
WASHING_YOUR_BACK: 2
REMOVING_SOMETHING_FROM_YOUR_BACK_POCKET: 2
PUTTING_ON_A_SHIRT_THAT_BUTTONS_DOWN_THE_FRONT: 0
AT_ITS_WORST?: 5
WHEN_LYING_ON_THE_INVOLVED_SIDE: 5
PUTTING_ON_AN_UNDERSHIRT_OR_A_PULLOVER_SWEATER: 0
PLEASE_INDICATE_YOR_PRIMARY_REASON_FOR_REFERRAL_TO_THERAPY:: SHOULDER
WASHING_YOUR_HAIR?: 1

## 2024-08-13 NOTE — PROGRESS NOTES
PHYSICAL THERAPY EVALUATION  Type of Visit: Evaluation             Subjective       Presenting condition or subjective complaint: Regular, continuous pain in the core of my shoulder. Referred to PT with L shoulder pain. Onset: 10-12 yrs ago with increase 7-. 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  Able to function. SLEEP: had one night he could not sleep. WORKOUT: bikes every other day, push ups, pull ups, biceps and triceps curls x 30-40#, overhead press x 30#. L handed.   Date of onset: 07/21/24 (has had pain x 10-15 yrs and increased approx 7-)    Relevant medical history: Depression; Pain at night or rest   Dates & types of surgery:  none    Prior diagnostic imaging/testing results: X-ray     Prior therapy history for the same diagnosis, illness or injury: No      Prior Level of Function  Transfers: Independent  Ambulation: Independent  ADL: Independent  IADL: Driving, Finances, Housekeeping, Laundry, Meal preparation, Work    Living Environment  Social support: Alone   Type of home: House   Stairs to enter the home: Yes 2 Is there a railing: No     Ramp: No   Stairs inside the home: Yes 13 Is there a railing: Yes     Help at home: None  Equipment owned: Walker; Raised toilet seat; Bath bench     Employment: Yes   Hobbies/Interests: Kayaking, gardening, hiking, camping, cycling    Patient goals for therapy: Sit or do activities without shoulder pain.    Pain assessment: joint noises, stiffness, numbness like symptoms down arm and into the treadwell surface of hand into fingers. Shoulder: over supraspinatus insertion L, along the upper trap area. Tender SCJ and negative ACJ mobs  Pain increases day after workouts - push ups, pull ups, shoulder press type activities.   Decreases: rest, helps to warm up before workouts, Volteran Cream, Ibuprofen, Tylenol.     Objective   SHOULDER EVALUATION  INTEGUMENTARY (edema, incisions):  WNL  POSTURE:  shoulders and head forward   GAIT: Normal  ROM:   CERVICAL AROM:   Retraction:  minimal loss, static :upper trap symptoms L;  Protraction: L scapular symptoms WNL  Flexion:minimal loss; Extension: Minimal loss, feels like it needs to crack  Rotation R: Moderate loss, left: minimal loss; SB R: and L: moderate loss  SCAPULAR AROM:   ELevation equal, depression and retraction minimal decrease, protraction with L scapular symptoms and WNL  SHOULDER: WNL AROM    STRENGTH:  MMT: ER neutral position: equal 4-/5; flexion: 4-/5 bilaterally, ABDuction: L: 4-/5, R: 4/5  SPECIAL TESTS: Increased symptoms L posterior glide, inferior glide with hyper mobility L compared to the R, no change anterior glide. NEER Test, Empty can negative. [place]'s Test: positive bilaterally and reverse: negative bilaterally. Negative Mitchell Montilla Test, Negative ABD ER with resistance, negative Crank magen. Poor scapular control with elevation.   PALPATION: Tender pectoralis minor, supraspinatus insertion    Assessment & Plan   CLINICAL IMPRESSIONS  Medical Diagnosis: Rotator cuff impingement syndrome of left shoulder (M75.42)    Treatment Diagnosis: Rotator cuff impingement syndrome of left shoulder (M75.42)   Impression/Assessment: Patient is a 43 year old male with workouts, general activities complaints.  The following significant findings have been identified: Pain, Decreased strength, Impaired muscle performance, Decreased activity tolerance, Impaired posture, and Instability. These impairments interfere with their ability to perform work tasks and recreational activities as compared to previous level of function.     Clinical Decision Making (Complexity):  Clinical Presentation: Stable/Uncomplicated  Clinical Presentation Rationale: based on medical and personal factors listed in PT evaluation  Clinical Decision Making (Complexity): Low complexity    PLAN OF CARE  Treatment Interventions:  Interventions: Manual Therapy,  Neuromuscular Re-education, Therapeutic Activity, Therapeutic Exercise, Self-Care/Home Management    Long Term Goals     PT Goal 1  Goal Identifier: SLeep  Goal Description: Dar will be able to lay on L shoulder at night and return to his baseline sleep pattern  Target Date: 09/17/24  PT Goal 2  Goal Identifier: Workout  Goal Description: Dar will complete workout with weights as previous using good techniques and without symptoms increasing more than 1/10 next day  Target Date: 09/19/24      Frequency of Treatment: 1  Duration of Treatment: 6 weeks    Recommended Referrals to Other Professionals:  no needs identified at this time  Education Assessment:   Learner/Method: Patient;Listening;Reading;Demonstration;Pictures/Video;No Barriers to Learning  Education Comments: cervical retraction, scapular sets, shoulder ER in neutral with band. PLan of care, goals    Risks and benefits of evaluation/treatment have been explained.   Patient/Family/caregiver agrees with Plan of Care.     Evaluation Time:     PT Jhony Low Complexity Minutes (28081): 40   Present: Not applicable     Signing Clinician: Princess Dupont, PT

## 2024-08-21 ENCOUNTER — THERAPY VISIT (OUTPATIENT)
Dept: PHYSICAL THERAPY | Facility: CLINIC | Age: 44
End: 2024-08-21
Attending: STUDENT IN AN ORGANIZED HEALTH CARE EDUCATION/TRAINING PROGRAM
Payer: COMMERCIAL

## 2024-08-21 DIAGNOSIS — M75.42 ROTATOR CUFF IMPINGEMENT SYNDROME OF LEFT SHOULDER: Primary | ICD-10-CM

## 2024-08-21 PROCEDURE — 97110 THERAPEUTIC EXERCISES: CPT | Mod: GP | Performed by: PHYSICAL THERAPIST

## 2024-09-03 ENCOUNTER — THERAPY VISIT (OUTPATIENT)
Dept: PHYSICAL THERAPY | Facility: CLINIC | Age: 44
End: 2024-09-03
Attending: STUDENT IN AN ORGANIZED HEALTH CARE EDUCATION/TRAINING PROGRAM
Payer: COMMERCIAL

## 2024-09-03 DIAGNOSIS — M75.42 ROTATOR CUFF IMPINGEMENT SYNDROME OF LEFT SHOULDER: Primary | ICD-10-CM

## 2024-09-03 PROCEDURE — 97110 THERAPEUTIC EXERCISES: CPT | Mod: GP | Performed by: PHYSICAL THERAPIST

## 2024-09-19 ENCOUNTER — OFFICE VISIT (OUTPATIENT)
Dept: FAMILY MEDICINE | Facility: CLINIC | Age: 44
End: 2024-09-19
Payer: COMMERCIAL

## 2024-09-19 VITALS
BODY MASS INDEX: 25.23 KG/M2 | OXYGEN SATURATION: 98 % | HEART RATE: 60 BPM | TEMPERATURE: 98 F | HEIGHT: 75 IN | DIASTOLIC BLOOD PRESSURE: 76 MMHG | RESPIRATION RATE: 16 BRPM | SYSTOLIC BLOOD PRESSURE: 118 MMHG | WEIGHT: 202.9 LBS

## 2024-09-19 DIAGNOSIS — Z13.220 LIPID SCREENING: ICD-10-CM

## 2024-09-19 DIAGNOSIS — Z00.00 ROUTINE GENERAL MEDICAL EXAMINATION AT A HEALTH CARE FACILITY: Primary | ICD-10-CM

## 2024-09-19 DIAGNOSIS — F12.10 MARIJUANA ABUSE: ICD-10-CM

## 2024-09-19 DIAGNOSIS — Z87.898 HISTORY OF ALCOHOL USE DISORDER: ICD-10-CM

## 2024-09-19 DIAGNOSIS — Z11.3 SCREEN FOR STD (SEXUALLY TRANSMITTED DISEASE): ICD-10-CM

## 2024-09-19 LAB
ALBUMIN SERPL BCG-MCNC: 4.7 G/DL (ref 3.5–5.2)
ALP SERPL-CCNC: 78 U/L (ref 40–150)
ALT SERPL W P-5'-P-CCNC: 34 U/L (ref 0–70)
ANION GAP SERPL CALCULATED.3IONS-SCNC: 10 MMOL/L (ref 7–15)
AST SERPL W P-5'-P-CCNC: 23 U/L (ref 0–45)
BILIRUB SERPL-MCNC: 0.8 MG/DL
BUN SERPL-MCNC: 13.7 MG/DL (ref 6–20)
CALCIUM SERPL-MCNC: 9.9 MG/DL (ref 8.8–10.4)
CHLORIDE SERPL-SCNC: 104 MMOL/L (ref 98–107)
CHOLEST SERPL-MCNC: 133 MG/DL
CREAT SERPL-MCNC: 1.06 MG/DL (ref 0.67–1.17)
EGFRCR SERPLBLD CKD-EPI 2021: 90 ML/MIN/1.73M2
FASTING STATUS PATIENT QL REPORTED: YES
FASTING STATUS PATIENT QL REPORTED: YES
GLUCOSE SERPL-MCNC: 90 MG/DL (ref 70–99)
HCO3 SERPL-SCNC: 26 MMOL/L (ref 22–29)
HDLC SERPL-MCNC: 64 MG/DL
HIV 1+2 AB+HIV1 P24 AG SERPL QL IA: NONREACTIVE
LDLC SERPL CALC-MCNC: 57 MG/DL
NONHDLC SERPL-MCNC: 69 MG/DL
POTASSIUM SERPL-SCNC: 5.3 MMOL/L (ref 3.4–5.3)
PROT SERPL-MCNC: 7.3 G/DL (ref 6.4–8.3)
SODIUM SERPL-SCNC: 140 MMOL/L (ref 135–145)
T PALLIDUM AB SER QL: NONREACTIVE
TRIGL SERPL-MCNC: 61 MG/DL

## 2024-09-19 PROCEDURE — 87591 N.GONORRHOEAE DNA AMP PROB: CPT | Performed by: STUDENT IN AN ORGANIZED HEALTH CARE EDUCATION/TRAINING PROGRAM

## 2024-09-19 PROCEDURE — 36415 COLL VENOUS BLD VENIPUNCTURE: CPT | Performed by: STUDENT IN AN ORGANIZED HEALTH CARE EDUCATION/TRAINING PROGRAM

## 2024-09-19 PROCEDURE — 90656 IIV3 VACC NO PRSV 0.5 ML IM: CPT | Performed by: STUDENT IN AN ORGANIZED HEALTH CARE EDUCATION/TRAINING PROGRAM

## 2024-09-19 PROCEDURE — 80053 COMPREHEN METABOLIC PANEL: CPT | Performed by: STUDENT IN AN ORGANIZED HEALTH CARE EDUCATION/TRAINING PROGRAM

## 2024-09-19 PROCEDURE — 87389 HIV-1 AG W/HIV-1&-2 AB AG IA: CPT | Performed by: STUDENT IN AN ORGANIZED HEALTH CARE EDUCATION/TRAINING PROGRAM

## 2024-09-19 PROCEDURE — 91320 SARSCV2 VAC 30MCG TRS-SUC IM: CPT | Performed by: STUDENT IN AN ORGANIZED HEALTH CARE EDUCATION/TRAINING PROGRAM

## 2024-09-19 PROCEDURE — 80061 LIPID PANEL: CPT | Performed by: STUDENT IN AN ORGANIZED HEALTH CARE EDUCATION/TRAINING PROGRAM

## 2024-09-19 PROCEDURE — 90480 ADMN SARSCOV2 VAC 1/ONLY CMP: CPT | Performed by: STUDENT IN AN ORGANIZED HEALTH CARE EDUCATION/TRAINING PROGRAM

## 2024-09-19 PROCEDURE — 99386 PREV VISIT NEW AGE 40-64: CPT | Mod: 25 | Performed by: STUDENT IN AN ORGANIZED HEALTH CARE EDUCATION/TRAINING PROGRAM

## 2024-09-19 PROCEDURE — 86780 TREPONEMA PALLIDUM: CPT | Performed by: STUDENT IN AN ORGANIZED HEALTH CARE EDUCATION/TRAINING PROGRAM

## 2024-09-19 PROCEDURE — 87491 CHLMYD TRACH DNA AMP PROBE: CPT | Performed by: STUDENT IN AN ORGANIZED HEALTH CARE EDUCATION/TRAINING PROGRAM

## 2024-09-19 PROCEDURE — 90471 IMMUNIZATION ADMIN: CPT | Performed by: STUDENT IN AN ORGANIZED HEALTH CARE EDUCATION/TRAINING PROGRAM

## 2024-09-19 ASSESSMENT — PAIN SCALES - GENERAL: PAINLEVEL: NO PAIN (0)

## 2024-09-19 NOTE — PATIENT INSTRUCTIONS
Patient Education   Preventive Care Advice   This is general advice given by our system to help you stay healthy. However, your care team may have specific advice just for you. Please talk to your care team about your preventive care needs.  Nutrition  Eat 5 or more servings of fruits and vegetables each day.  Try wheat bread, brown rice and whole grain pasta (instead of white bread, rice, and pasta).  Get enough calcium and vitamin D. Check the label on foods and aim for 100% of the RDA (recommended daily allowance).  Lifestyle  Exercise at least 150 minutes each week  (30 minutes a day, 5 days a week).  Do muscle strengthening activities 2 days a week. These help control your weight and prevent disease.  No smoking.  Wear sunscreen to prevent skin cancer.  Have a dental exam and cleaning every 6 months.  Yearly exams  See your health care team every year to talk about:  Any changes in your health.  Any medicines your care team has prescribed.  Preventive care, family planning, and ways to prevent chronic diseases.  Shots (vaccines)   HPV shots (up to age 26), if you've never had them before.  Hepatitis B shots (up to age 59), if you've never had them before.  COVID-19 shot: Get this shot when it's due.  Flu shot: Get a flu shot every year.  Tetanus shot: Get a tetanus shot every 10 years.  Pneumococcal, hepatitis A, and RSV shots: Ask your care team if you need these based on your risk.  Shingles shot (for age 50 and up)  General health tests  Diabetes screening:  Starting at age 35, Get screened for diabetes at least every 3 years.  If you are younger than age 35, ask your care team if you should be screened for diabetes.  Cholesterol test: At age 39, start having a cholesterol test every 5 years, or more often if advised.  Bone density scan (DEXA): At age 50, ask your care team if you should have this scan for osteoporosis (brittle bones).  Hepatitis C: Get tested at least once in your life.  STIs (sexually  transmitted infections)  Before age 24: Ask your care team if you should be screened for STIs.  After age 24: Get screened for STIs if you're at risk. You are at risk for STIs (including HIV) if:  You are sexually active with more than one person.  You don't use condoms every time.  You or a partner was diagnosed with a sexually transmitted infection.  If you are at risk for HIV, ask about PrEP medicine to prevent HIV.  Get tested for HIV at least once in your life, whether you are at risk for HIV or not.  Cancer screening tests  Cervical cancer screening: If you have a cervix, begin getting regular cervical cancer screening tests starting at age 21.  Breast cancer scan (mammogram): If you've ever had breasts, begin having regular mammograms starting at age 40. This is a scan to check for breast cancer.  Colon cancer screening: It is important to start screening for colon cancer at age 45.  Have a colonoscopy test every 10 years (or more often if you're at risk) Or, ask your provider about stool tests like a FIT test every year or Cologuard test every 3 years.  To learn more about your testing options, visit:   .  For help making a decision, visit:   https://bit.ly/ku74540.  Prostate cancer screening test: If you have a prostate, ask your care team if a prostate cancer screening test (PSA) at age 55 is right for you.  Lung cancer screening: If you are a current or former smoker ages 50 to 80, ask your care team if ongoing lung cancer screenings are right for you.  For informational purposes only. Not to replace the advice of your health care provider. Copyright   2023 [facility]. All rights reserved. Clinically reviewed by the [facility]. [employer] [id number] - REV 01/24.  Substance Use Disorder: Care Instructions  Overview     You can improve your life and health by stopping your use of alcohol or drugs. When you don't drink or use drugs, you may feel and sleep better. You may  get along better with your family, friends, and coworkers. There are medicines and programs that can help with substance use disorder.  How can you care for yourself at home?  Here are some ways to help you stay sober and prevent relapse.  If you have been given medicine to help keep you sober or reduce your cravings, be sure to take it exactly as prescribed.  Talk to your doctor about programs that can help you stop using drugs or drinking alcohol.  Do not keep alcohol or drugs in your home.  Plan ahead. Think about what you'll say if other people ask you to drink or use drugs. Try not to spend time with people who drink or use drugs.  Use the time and money spent on drinking or drugs to do something that's important to you.  Preventing a relapse  Have a plan to deal with relapse. Learn to recognize changes in your thinking that lead you to drink or use drugs. Get help before you start to drink or use drugs again.  Try to stay away from situations, friends, or places that may lead you to drink or use drugs.  If you feel the need to drink alcohol or use drugs again, seek help right away. Call a trusted friend or family member. Some people get support from organizations such as Narcotics Anonymous or [employer] or from treatment facilities.  If you relapse, get help as soon as you can. Some people make a plan with another person that outlines what they want that person to do for them if they relapse. The plan usually includes how to handle the relapse and who to notify in case of relapse.  Don't give up. Remember that a relapse doesn't mean that you have failed. Use the experience to learn the triggers that lead you to drink or use drugs. Then quit again. Recovery is a lifelong process. Many people have several relapses before they are able to quit for good.  Follow-up care is a key part of your treatment and safety. Be sure to make and go to all appointments, and call your doctor if you are having problems. It's  "also a good idea to know your test results and keep a list of the medicines you take.  When should you call for help?   Call 911  anytime you think you may need emergency care. For example, call if you or someone else:    Has overdosed or has withdrawal signs. Be sure to tell the emergency workers that you are or someone else is using or trying to quit using drugs. Overdose or withdrawal signs may include:  Losing consciousness.  Seizure.  Seeing or hearing things that aren't there (hallucinations).     Is thinking or talking about suicide or harming others.   Where to get help 24 hours a day, 7 days a week   If you or someone you know talks about suicide, self-harm, a mental health crisis, a substance use crisis, or any other kind of emotional distress, get help right away. You can:    Call the Suicide and Crisis Lifeline at 988.     Call 6-036-621-TALK (1-142.964.6260).     Text HOME to 433522 to access the Crisis Text Line.   Consider saving these numbers in your phone.  Go to [employer] for more information or to chat online.  Call your doctor now or seek immediate medical care if:    You are having withdrawal symptoms. These may include nausea or vomiting, sweating, shakiness, and anxiety.   Watch closely for changes in your health, and be sure to contact your doctor if:    You have a relapse.     You need more help or support to stop.   Where can you learn more?  Go to https://www.[employer].net/patiented  Enter H573 in the search box to learn more about \"Substance Use Disorder: Care Instructions.\"  Current as of: November 15, 2023               Content Version: 14.0    2143-4514 [email or web address].   Care instructions adapted under license by your healthcare professional. If you have questions about a medical condition or this instruction, always ask your healthcare professional. [email or web address] disclaims any warranty or liability for your use of this information.      Safer Sex: Care " Instructions  Overview  Safer sex is a way to reduce your risk of getting a sexually transmitted infection (STI). It can also help prevent pregnancy.  Several products can help you practice safer sex and reduce your chance of STIs. One of the best is a condom. There are internal and external condoms. You can use a special rubber sheet (dental dam) for protection during oral sex. Disposable gloves can keep your hands from touching blood, semen, or other body fluids that can carry infections.  Remember that birth control methods such as diaphragms, IUDs, foams, and birth control pills do not stop you from getting STIs.  Follow-up care is a key part of your treatment and safety. Be sure to make and go to all appointments, and call your doctor if you are having problems. It's also a good idea to know your test results and keep a list of the medicines you take.  How can you care for yourself at home?  Think about getting vaccinated to help prevent hepatitis A, hepatitis B, and human papillomavirus (HPV). They can be spread through sex.  Use a condom every time you have sex. Use an external condom, which goes on the penis. Or use an internal condom, which goes into the vagina or anus.  Make sure you use the right size external condom. A condom that's too small can break easily. A condom that's too big can slip off during sex.  Use a new condom each time you have sex. Be careful not to poke a hole in the condom when you open the wrapper.  Don't use an internal condom and an external condom at the same time.  Never use petroleum jelly (such as Vaseline), grease, hand lotion, baby oil, or anything with oil in it. These products can make holes in the condom.  After intercourse, hold the edge of the condom as you remove it. This will help keep semen from spilling out of the condom.  Do not have sex with anyone who has symptoms of an STI, such as sores on the genitals or mouth.  Do not drink a lot of alcohol or use drugs before  "sex.  Limit your sex partners. Sex with one partner who has sex only with you can reduce your risk of getting an STI.  Don't share sex toys. But if you do share them, use a condom and clean the sex toys between each use.  Talk to any partners before you have sex. Talk about what you feel comfortable with and whether you have any boundaries with sex. And find out if your partner or partners may be at risk for any STI. Keep in mind that a person may be able to spread an STI even if they do not have symptoms. You and any partners may want to get tested for STIs.  Where can you learn more?  Go to https://www.[facility].net/patiented  Enter B608 in the search box to learn more about \"Safer Sex: Care Instructions.\"  Current as of: November 27, 2023               Content Version: 14.0    3256-3435 [employer].   Care instructions adapted under license by your healthcare professional. If you have questions about a medical condition or this instruction, always ask your healthcare professional. [employer] disclaims any warranty or liability for your use of this information.         "

## 2024-09-19 NOTE — PROGRESS NOTES
Preventive Care Visit  [facility]  Wade Willis MD, Family Medicine  Sep 19, 2024      Assessment & Plan   Problem List Items Addressed This Visit    None  Visit Diagnoses       Routine general medical examination at a health care facility    -  Primary    History of alcohol use disorder        Relevant Orders    Comprehensive metabolic panel (BMP + Alb, Alk Phos, ALT, AST, Total. Bili, TP)    Marijuana abuse        Lipid screening        Relevant Orders    Lipid panel reflex to direct LDL Fasting    Screen for STD (sexually transmitted disease)        Relevant Orders    HIV Antigen Antibody Combo    Treponema Abs w Reflex to RPR and Titer    NEISSERIA GONORRHOEA PCR    CHLAMYDIA TRACHOMATIS PCR           Congratulated him on alcohol cessation.  Age-appropriate screening and immunization discussed.  Will repeat labs and STD testing today.  Recommend marijuana cessation.    Patient has been advised of split billing requirements and indicates understanding: Yes       Counseling  Appropriate preventive services were addressed with this patient via screening, questionnaire, or discussion as appropriate for fall prevention, nutrition, physical activity, Tobacco-use cessation, social engagement, weight loss and cognition.  Checklist reviewing preventive services available has been given to the patient.  Reviewed patient's diet, addressing concerns and/or questions.   The patient was instructed to see the dentist every 6 months.         Artemio Dodge is a 44 year old, presenting for the following:  Physical        9/19/2024    12:43 PM   Additional Questions   Roomed by Delmi VAUGHAN        Health Care Directive  Patient does not have a Health Care Directive or Living Will: Discussed advance care planning with patient; information given to patient to review.    HPI        9/16/2024   General Health   How would you rate your overall physical health? Excellent   Feel stress  (tense, anxious, or unable to sleep) Not at all            9/16/2024   Nutrition   Three or more servings of calcium each day? Yes   Diet: Regular (no restrictions)    Breakfast skipped   How many servings of fruit and vegetables per day? (!) 2-3   How many sweetened beverages each day? 0-1       Multiple values from one day are sorted in reverse-chronological order         9/16/2024   Exercise   Days per week of moderate/strenous exercise 5 days   Average minutes spent exercising at this level 60 min            9/16/2024   Social Factors   Frequency of gathering with friends or relatives Once a week   Worry food won't last until get money to buy more No   Food not last or not have enough money for food? No   Do you have housing? (Housing is defined as stable permanent housing and does not include staying ouside in a car, in a tent, in an abandoned building, in an overnight shelter, or couch-surfing.) Yes   Are you worried about losing your housing? No   Lack of transportation? No   Unable to get utilities (heat,electricity)? No            9/16/2024   Dental   Dentist two times every year? (!) NO            9/16/2024   TB Screening   Were you born outside of the [place]? No            Today's PHQ-2 Score:       9/19/2024    11:19 AM   PHQ-2 ( 1999 [employer])   Q1: Little interest or pleasure in doing things 0   Q2: Feeling down, depressed or hopeless 0   PHQ-2 Score 0   Q1: Little interest or pleasure in doing things Not at all   Q2: Feeling down, depressed or hopeless Not at all   PHQ-2 Score 0           9/16/2024   Substance Use   Alcohol more than 3/day or more than 7/wk No   Do you use any other substances recreationally? (!) CANNABIS PRODUCTS        Social History     Tobacco Use    Smoking status: Never    Smokeless tobacco: Never   Vaping Use    Vaping status: Never Used   Substance Use Topics    Alcohol use: Not Currently     Comment: every day     Drug use: No           9/16/2024   STI Screening   New sexual  "partner(s) since last STI/HIV test? (!) YES       ASCVD Risk   The 10-year ASCVD risk score (Felipe PARRA, et al., 2019) is: 0.7%    Values used to calculate the score:      Age: 44 years      Sex: Male      Is Non- : No      Diabetic: No      Tobacco smoker: No      Systolic Blood Pressure: 118 mmHg      Is BP treated: No      HDL Cholesterol: 66 mg/dL      Total Cholesterol: 179 mg/dL        9/16/2024   Contraception/Family Planning   Questions about contraception or family planning No           Reviewed and updated as needed this visit by Provider                    Past Medical History:   Diagnosis Date    De Quervain's disease (tenosynovitis)     Depressive disorder 2019     Past Surgical History:   Procedure Laterality Date    GENITOURINARY SURGERY  12/2023    Vasectomy         Review of Systems  Constitutional, HEENT, cardiovascular, pulmonary, GI, , musculoskeletal, neuro, skin, endocrine and psych systems are negative, except as otherwise noted.     Objective    Exam  /76 (BP Location: Left arm, Patient Position: Chair)   Pulse 60   Temp 98  F (36.7  C) (Temporal)   Resp 16   Ht 1.905 m (6' 3\")   Wt 92 kg (202 lb 14.4 oz)   SpO2 98%   BMI 25.36 kg/m     Estimated body mass index is 25.36 kg/m  as calculated from the following:    Height as of this encounter: 1.905 m (6' 3\").    Weight as of this encounter: 92 kg (202 lb 14.4 oz).    Physical Exam  GENERAL: alert and no distress  EYES: Eyes grossly normal to inspection, PERRL and conjunctivae and sclerae normal  HENT: ear canals and TM's normal, nose and mouth without ulcers or lesions  NECK: no adenopathy, no asymmetry, masses, or scars  RESP: lungs clear to auscultation - no rales, rhonchi or wheezes  CV: regular rate and rhythm, normal S1 S2, no S3 or S4, no murmur, click or rub, no peripheral edema  ABDOMEN: soft, nontender, no hepatosplenomegaly, no masses and bowel sounds normal  MS: no gross musculoskeletal " defects noted, no edema  SKIN: no suspicious lesions or rashes  NEURO: Normal strength and tone, mentation intact and speech normal  PSYCH: mentation appears normal, affect normal/bright        Signed Electronically by: Wade Willis MD

## 2024-09-20 LAB
C TRACH DNA SPEC QL NAA+PROBE: NEGATIVE
N GONORRHOEA DNA SPEC QL NAA+PROBE: NEGATIVE

## 2024-09-24 ENCOUNTER — THERAPY VISIT (OUTPATIENT)
Dept: PHYSICAL THERAPY | Facility: CLINIC | Age: 44
End: 2024-09-24
Attending: STUDENT IN AN ORGANIZED HEALTH CARE EDUCATION/TRAINING PROGRAM
Payer: COMMERCIAL

## 2024-09-24 DIAGNOSIS — M75.42 ROTATOR CUFF IMPINGEMENT SYNDROME OF LEFT SHOULDER: Primary | ICD-10-CM

## 2024-09-24 PROCEDURE — 97110 THERAPEUTIC EXERCISES: CPT | Mod: GP | Performed by: PHYSICAL THERAPIST

## 2024-09-24 NOTE — PROGRESS NOTES
09/24/24 0500   Appointment Info   Total/Authorized Visits [facility] [employer]   Visits Used 4   Medical Diagnosis Rotator cuff impingement syndrome of left shoulder (M75.42)   PT Tx Diagnosis Rotator cuff impingement syndrome of left shoulder (M75.42)   Precautions/Limitations none known   Other pertinent information Dar is L handed and has pain with activities especially push ups and pull ups. Symptoms increase next day, popping and other joint noises. Better at rest.   Progress Note/Certification   Onset of illness/injury or Date of Surgery 07/21/24  (has had pain x 10-15 yrs and increased approx 7-)   Therapy Frequency 1   Predicted Duration 6 weeks   Progress Note Due Date 09/24/24   Progress Note Completed Date 08/13/24       Present No   GOALS   PT Goals 2   PT Goal 1   Goal Identifier SLeep   Goal Description Dar will be able to lay on L shoulder at night and return to his baseline sleep pattern   Goal Progress ABle to sleep more on L side   Target Date 09/17/24   Date Met 09/24/24   PT Goal 2   Goal Identifier Workout   Goal Description Dar will complete workout with weights as previous using good techniques and without symptoms increasing more than 1/10 next day   Goal Progress have returned to this with caution eg push ups and pull ups   Target Date 10/30/24   Subjective Report   Subjective Report Completing home program does get some discomfort at times. pain 100% improved - no pain x 2 weeks. not pushing through and avoiding aggravation of shoulder. back to biking with leaning x 30 minutes per ride. No neck pain. Scapular sets helpful.   Objective Measure 1   Objective Measure Symptoms   Details 0/10 at this time.   Objective Measure 2   Objective Measure SPADI (from 8-: 10-13-13.85)   Details sent to his My chart   Objective Measure 3   Objective Measure shoulder AROM/MMT   Details Equal AROM, MMT WNL   Treatment Interventions (PT)    Interventions Therapeutic Procedure/Exercise   Therapeutic Procedure/Exercise   Therapeutic Procedures: strength, endurance, ROM, flexibility minutes (66613) 23   Ther Proc 1 Home exercises   Ther Proc 1 - Details Reviewed exercises completing at home. Recommendations: Keep elbows ahead of shoulders with chest press/incline press, push ups, dips etc. Practiced and demonstrated open chain proprioception for shoulder using red band and light weight in middle - arms 90-90 position. Goals reviewed, continue to gradually progress into home program, Can decrease cervical retraction and scapular set frequency - complete at work for comfort and with exercises for better posture/positioning.   Skilled Intervention recommendations   Patient Response/Progress no questions   Education   Learner/Method Patient;Listening;Demonstration;No Barriers to Learning   Education Comments proprioception with band/weight multiple positions, goals reviewed, plan of care.   Plan   Home program cervical retraction, scapular sets, shoulder ER in neutral with band, TB - shoulder horizontal abduction with diagonals and scapular contractions at end range, PUsh up plus wall->counter, low rows, ball push from counter to wall and progression to floor, short body blade, light weights with high reps, scapular stabilization, proprioception with band/weight multiple positions,   Updates to plan of care discharge   Comments   Comments improving symptoms L shoulder. Completing home program and has added/advanced as comfortable. Very aware of scapular position with imrpoved scapular mobility with elevation in scapular plane.  Very slight instability L shoulder with anterior glide and normal with inferior and posterior gliding. He feels ready to be discharged from PT . SPADI sent to him via my chart today for completion.   Total Session Time   Timed Code Treatment Minutes 23   Total Treatment Time (sum of timed and untimed services) 23          DISCHARGE  Reason for Discharge: meeting goals, improved stability and strength. Independent home program    Equipment Issued: none    Discharge Plan: Patient to continue home program.    Referring Provider:  Seng Bashir DO

## 2024-09-24 NOTE — PROGRESS NOTES
09/24/24 0500   Appointment Info   Total/Authorized Visits [employer]   Visits Used 4   Medical Diagnosis Rotator cuff impingement syndrome of left shoulder (M75.42)   PT Tx Diagnosis Rotator cuff impingement syndrome of left shoulder (M75.42)   Precautions/Limitations none known   Other pertinent information Dar is L handed and has pain with activities especially push ups and pull ups. Symptoms increase next day, popping and other joint noises. Better at rest.   Progress Note/Certification   Onset of illness/injury or Date of Surgery 07/21/24  (has had pain x 10-15 yrs and increased approx 7-)   Therapy Frequency 1   Predicted Duration 6 weeks   Progress Note Due Date 09/24/24   Progress Note Completed Date 08/13/24       Present No   GOALS   PT Goals 2   PT Goal 1   Goal Identifier SLeep   Goal Description Dar will be able to lay on L shoulder at night and return to his baseline sleep pattern   Goal Progress ABle to sleep more on L side   Target Date 09/17/24   Date Met 09/24/24   PT Goal 2   Goal Identifier Workout   Goal Description Dar will complete workout with weights as previous using good techniques and without symptoms increasing more than 1/10 next day   Goal Progress have returned to this with caution eg push ups and pull ups   Target Date 10/30/24   Subjective Report   Subjective Report Completing home program does get some discomfort at times. pain 100% improved - no pain x 2 weeks. not pushing through and avoiding aggravation of shoulder. back to biking with leaning x 30 minutes per ride. No neck pain. Scapular sets helpful.   Objective Measure 1   Objective Measure Symptoms   Details 0/10 at this time.   Objective Measure 2   Objective Measure SPADI (from 8-: 10-13-13.85)   Details 0-0   Objective Measure 3   Objective Measure shoulder AROM/MMT   Details Equal AROM, MMT WNL   Treatment Interventions (PT)   Interventions  Therapeutic Procedure/Exercise   Therapeutic Procedure/Exercise   Therapeutic Procedures: strength, endurance, ROM, flexibility minutes (60696) 23   Ther Proc 1 Home exercises   Ther Proc 1 - Details Reviewed exercises completing at home. Recommendations: Keep elbows ahead of shoulders with chest press/incline press, push ups, dips etc. Practiced and demonstrated open chain proprioception for shoulder using red band and light weight in middle - arms 90-90 position. Goals reviewed, continue to gradually progress into home program, Can decrease cervical retraction and scapular set frequency - complete at work for comfort and with exercises for better posture/positioning.   Skilled Intervention recommendations   Patient Response/Progress no questions   Education   Learner/Method Patient;Listening;Demonstration;No Barriers to Learning   Education Comments proprioception with band/weight multiple positions, goals reviewed, plan of care.   Plan   Home program cervical retraction, scapular sets, shoulder ER in neutral with band, TB - shoulder horizontal abduction with diagonals and scapular contractions at end range, PUsh up plus wall->counter, low rows, ball push from counter to wall and progression to floor, short body blade, light weights with high reps, scapular stabilization, proprioception with band/weight multiple positions,   Updates to plan of care discharge   Comments   Comments improving symptoms L shoulder. Completing home program and has added/advanced as comfortable. Very aware of scapular position with imrpoved scapular mobility with elevation in scapular plane.  Very slight instability L shoulder with anterior glide and normal with inferior and posterior gliding. He feels ready to be discharged from PT . FRANCISCO JAVIER sent to him via my chart today for completion.   Total Session Time   Timed Code Treatment Minutes 23   Total Treatment Time (sum of timed and untimed services) 23         DISCHARGE  Reason for  Discharge: Meeting goals, no pain, understand concepts for gradual return to strength training.     Equipment Issued: has home gym    Discharge Plan: Patient to continue home program.    Referring Provider:  Seng Bashir DO

## 2025-08-05 ENCOUNTER — OFFICE VISIT (OUTPATIENT)
Dept: FAMILY MEDICINE | Facility: OTHER | Age: 45
End: 2025-08-05
Payer: COMMERCIAL

## 2025-08-05 VITALS
BODY MASS INDEX: 26.05 KG/M2 | OXYGEN SATURATION: 96 % | RESPIRATION RATE: 16 BRPM | DIASTOLIC BLOOD PRESSURE: 68 MMHG | HEART RATE: 56 BPM | TEMPERATURE: 97.6 F | HEIGHT: 75 IN | WEIGHT: 209.5 LBS | SYSTOLIC BLOOD PRESSURE: 114 MMHG

## 2025-08-05 DIAGNOSIS — L03.115 CELLULITIS OF RIGHT LOWER EXTREMITY: ICD-10-CM

## 2025-08-05 DIAGNOSIS — T63.464A WASP STING, UNDETERMINED INTENT, INITIAL ENCOUNTER: Primary | ICD-10-CM

## 2025-08-05 PROCEDURE — 1125F AMNT PAIN NOTED PAIN PRSNT: CPT | Performed by: PHYSICIAN ASSISTANT

## 2025-08-05 PROCEDURE — 99213 OFFICE O/P EST LOW 20 MIN: CPT | Performed by: PHYSICIAN ASSISTANT

## 2025-08-05 PROCEDURE — 3074F SYST BP LT 130 MM HG: CPT | Performed by: PHYSICIAN ASSISTANT

## 2025-08-05 PROCEDURE — 3078F DIAST BP <80 MM HG: CPT | Performed by: PHYSICIAN ASSISTANT

## 2025-08-05 RX ORDER — CEPHALEXIN 500 MG/1
500 CAPSULE ORAL 2 TIMES DAILY
Qty: 14 CAPSULE | Refills: 0 | Status: SHIPPED | OUTPATIENT
Start: 2025-08-05 | End: 2025-08-12

## 2025-08-05 RX ORDER — TRIAMCINOLONE ACETONIDE 1 MG/G
OINTMENT TOPICAL 2 TIMES DAILY
Qty: 15 G | Refills: 0 | Status: SHIPPED | OUTPATIENT
Start: 2025-08-05

## 2025-08-05 ASSESSMENT — PAIN SCALES - GENERAL: PAINLEVEL_OUTOF10: MILD PAIN (2)

## 2025-08-20 ENCOUNTER — PATIENT OUTREACH (OUTPATIENT)
Dept: CARE COORDINATION | Facility: CLINIC | Age: 45
End: 2025-08-20
Payer: COMMERCIAL